# Patient Record
Sex: FEMALE | Race: WHITE | Employment: FULL TIME
[De-identification: names, ages, dates, MRNs, and addresses within clinical notes are randomized per-mention and may not be internally consistent; named-entity substitution may affect disease eponyms.]

---

## 2022-03-19 PROBLEM — G43.009 MIGRAINE WITHOUT AURA AND WITHOUT STATUS MIGRAINOSUS, NOT INTRACTABLE: Status: ACTIVE | Noted: 2021-02-17

## 2022-03-19 PROBLEM — F41.9 ANXIETY: Status: ACTIVE | Noted: 2021-01-20

## 2022-03-19 PROBLEM — F33.1 MODERATE EPISODE OF RECURRENT MAJOR DEPRESSIVE DISORDER (HCC): Status: ACTIVE | Noted: 2021-01-20

## 2022-09-16 ENCOUNTER — OFFICE VISIT (OUTPATIENT)
Dept: FAMILY MEDICINE CLINIC | Facility: CLINIC | Age: 27
End: 2022-09-16
Payer: COMMERCIAL

## 2022-09-16 VITALS
RESPIRATION RATE: 16 BRPM | DIASTOLIC BLOOD PRESSURE: 76 MMHG | OXYGEN SATURATION: 98 % | HEART RATE: 103 BPM | HEIGHT: 66 IN | WEIGHT: 144.2 LBS | BODY MASS INDEX: 23.18 KG/M2 | TEMPERATURE: 97.3 F | SYSTOLIC BLOOD PRESSURE: 114 MMHG

## 2022-09-16 DIAGNOSIS — F41.9 ANXIETY: ICD-10-CM

## 2022-09-16 DIAGNOSIS — F33.1 MODERATE EPISODE OF RECURRENT MAJOR DEPRESSIVE DISORDER (HCC): Primary | ICD-10-CM

## 2022-09-16 PROCEDURE — 99214 OFFICE O/P EST MOD 30 MIN: CPT | Performed by: FAMILY MEDICINE

## 2022-09-16 RX ORDER — ESCITALOPRAM OXALATE 20 MG/1
20 TABLET ORAL DAILY
Qty: 30 TABLET | Refills: 11 | Status: SHIPPED | OUTPATIENT
Start: 2022-09-16

## 2022-09-16 ASSESSMENT — PATIENT HEALTH QUESTIONNAIRE - PHQ9
3. TROUBLE FALLING OR STAYING ASLEEP: 0
8. MOVING OR SPEAKING SO SLOWLY THAT OTHER PEOPLE COULD HAVE NOTICED. OR THE OPPOSITE, BEING SO FIGETY OR RESTLESS THAT YOU HAVE BEEN MOVING AROUND A LOT MORE THAN USUAL: 0
9. THOUGHTS THAT YOU WOULD BE BETTER OFF DEAD, OR OF HURTING YOURSELF: 0
7. TROUBLE CONCENTRATING ON THINGS, SUCH AS READING THE NEWSPAPER OR WATCHING TELEVISION: 0
SUM OF ALL RESPONSES TO PHQ QUESTIONS 1-9: 6
SUM OF ALL RESPONSES TO PHQ QUESTIONS 1-9: 6
1. LITTLE INTEREST OR PLEASURE IN DOING THINGS: 1
SUM OF ALL RESPONSES TO PHQ QUESTIONS 1-9: 6
6. FEELING BAD ABOUT YOURSELF - OR THAT YOU ARE A FAILURE OR HAVE LET YOURSELF OR YOUR FAMILY DOWN: 1
5. POOR APPETITE OR OVEREATING: 2
SUM OF ALL RESPONSES TO PHQ QUESTIONS 1-9: 6
10. IF YOU CHECKED OFF ANY PROBLEMS, HOW DIFFICULT HAVE THESE PROBLEMS MADE IT FOR YOU TO DO YOUR WORK, TAKE CARE OF THINGS AT HOME, OR GET ALONG WITH OTHER PEOPLE: 1
SUM OF ALL RESPONSES TO PHQ9 QUESTIONS 1 & 2: 2
4. FEELING TIRED OR HAVING LITTLE ENERGY: 1
2. FEELING DOWN, DEPRESSED OR HOPELESS: 1

## 2022-09-16 NOTE — PROGRESS NOTES
[address] [facility] [place] [address]  Phone: [phone] Fax [phone]  Samantha Vallejo MD  9/16/2022           Ms. Maria Elena Lee  is a 24y.o.  year old  female patient who comes in to discuss issues related to her depression and anxiety. She is currently taking Lexapro 10 mg and it has been working well for her without side effects. Several months ago her  became very distant and now he says he wants to divorce and she is really been struggling over that. She is not having thoughts of suicide but she is not as happy as she works. PHQ-9  9/16/2022   Little interest or pleasure in doing things 1   Little interest or pleasure in doing things -   Feeling down, depressed, or hopeless 1   Trouble falling or staying asleep, or sleeping too much 0   Trouble falling or staying asleep, or sleeping too much -   Feeling tired or having little energy 1   Feeling tired or having little energy -   Poor appetite or overeating 2   Poor appetite, weight loss, or overeating -   Feeling bad about yourself - or that you are a failure or have let yourself or your family down 1   Feeling bad about yourself - or that you are a failure or have let yourself or your family down -   Trouble concentrating on things, such as reading the newspaper or watching television 0   Trouble concentrating on things such as school, work, reading, or watching TV -   Moving or speaking so slowly that other people could have noticed.  Or the opposite - being so fidgety or restless that you have been moving around a lot more than usual 0   Moving or speaking so slowly that other people could have noticed; or the opposite being so fidgety that others notice -   Thoughts that you would be better off dead, or of hurting yourself in some way 0   Thoughts of being better off dead, or hurting yourself in some way -   PHQ-2 Score 2   Total Score PHQ 2 -   PHQ-9 Total Score 6   PHQ 9 Score -   If you checked ([facility].)  -     escitalopram (LEXAPRO) 20 MG tablet; Take 1 tablet by mouth daily    Anxiety  -     escitalopram (LEXAPRO) 20 MG tablet; Take 1 tablet by mouth daily      Increase Lexapro. Follow-up 6 months sooner if needed.   Bradford Morrow MD

## 2023-03-17 ENCOUNTER — OFFICE VISIT (OUTPATIENT)
Dept: FAMILY MEDICINE CLINIC | Facility: CLINIC | Age: 28
End: 2023-03-17
Payer: COMMERCIAL

## 2023-03-17 VITALS
WEIGHT: 145 LBS | SYSTOLIC BLOOD PRESSURE: 108 MMHG | HEIGHT: 66 IN | TEMPERATURE: 97 F | RESPIRATION RATE: 16 BRPM | HEART RATE: 95 BPM | BODY MASS INDEX: 23.3 KG/M2 | DIASTOLIC BLOOD PRESSURE: 66 MMHG | OXYGEN SATURATION: 97 %

## 2023-03-17 DIAGNOSIS — F41.9 ANXIETY: ICD-10-CM

## 2023-03-17 DIAGNOSIS — F33.1 MODERATE EPISODE OF RECURRENT MAJOR DEPRESSIVE DISORDER (HCC): Primary | ICD-10-CM

## 2023-03-17 DIAGNOSIS — F51.01 PRIMARY INSOMNIA: ICD-10-CM

## 2023-03-17 DIAGNOSIS — J01.00 ACUTE NON-RECURRENT MAXILLARY SINUSITIS: ICD-10-CM

## 2023-03-17 PROCEDURE — 99214 OFFICE O/P EST MOD 30 MIN: CPT | Performed by: FAMILY MEDICINE

## 2023-03-17 RX ORDER — ESCITALOPRAM OXALATE 20 MG/1
20 TABLET ORAL DAILY
Qty: 30 TABLET | Refills: 11 | Status: SHIPPED | OUTPATIENT
Start: 2023-03-17

## 2023-03-17 RX ORDER — TRAZODONE HYDROCHLORIDE 50 MG/1
50 TABLET ORAL NIGHTLY PRN
Qty: 30 TABLET | Refills: 5 | Status: SHIPPED | OUTPATIENT
Start: 2023-03-17

## 2023-03-17 SDOH — ECONOMIC STABILITY: FOOD INSECURITY: WITHIN THE PAST 12 MONTHS, YOU WORRIED THAT YOUR FOOD WOULD RUN OUT BEFORE YOU GOT MONEY TO BUY MORE.: NEVER TRUE

## 2023-03-17 SDOH — ECONOMIC STABILITY: FOOD INSECURITY: WITHIN THE PAST 12 MONTHS, THE FOOD YOU BOUGHT JUST DIDN'T LAST AND YOU DIDN'T HAVE MONEY TO GET MORE.: NEVER TRUE

## 2023-03-17 SDOH — ECONOMIC STABILITY: INCOME INSECURITY: HOW HARD IS IT FOR YOU TO PAY FOR THE VERY BASICS LIKE FOOD, HOUSING, MEDICAL CARE, AND HEATING?: NOT HARD AT ALL

## 2023-03-17 SDOH — ECONOMIC STABILITY: HOUSING INSECURITY
IN THE LAST 12 MONTHS, WAS THERE A TIME WHEN YOU DID NOT HAVE A STEADY PLACE TO SLEEP OR SLEPT IN A SHELTER (INCLUDING NOW)?: NO

## 2023-03-17 ASSESSMENT — PATIENT HEALTH QUESTIONNAIRE - PHQ9
4. FEELING TIRED OR HAVING LITTLE ENERGY: 1
3. TROUBLE FALLING OR STAYING ASLEEP: 3
6. FEELING BAD ABOUT YOURSELF - OR THAT YOU ARE A FAILURE OR HAVE LET YOURSELF OR YOUR FAMILY DOWN: 1
2. FEELING DOWN, DEPRESSED OR HOPELESS: 1
SUM OF ALL RESPONSES TO PHQ QUESTIONS 1-9: 7
7. TROUBLE CONCENTRATING ON THINGS, SUCH AS READING THE NEWSPAPER OR WATCHING TELEVISION: 0
8. MOVING OR SPEAKING SO SLOWLY THAT OTHER PEOPLE COULD HAVE NOTICED. OR THE OPPOSITE, BEING SO FIGETY OR RESTLESS THAT YOU HAVE BEEN MOVING AROUND A LOT MORE THAN USUAL: 0
5. POOR APPETITE OR OVEREATING: 0
9. THOUGHTS THAT YOU WOULD BE BETTER OFF DEAD, OR OF HURTING YOURSELF: 0
SUM OF ALL RESPONSES TO PHQ9 QUESTIONS 1 & 2: 2
1. LITTLE INTEREST OR PLEASURE IN DOING THINGS: 1
SUM OF ALL RESPONSES TO PHQ QUESTIONS 1-9: 7
10. IF YOU CHECKED OFF ANY PROBLEMS, HOW DIFFICULT HAVE THESE PROBLEMS MADE IT FOR YOU TO DO YOUR WORK, TAKE CARE OF THINGS AT HOME, OR GET ALONG WITH OTHER PEOPLE: 1

## 2023-03-17 NOTE — PROGRESS NOTES
[address] [facility] [place]  [place], [place]  Phone: [phone] Fax [phone]  Archie Benavides MD  3/17/2023           Ms. Adelaide Ca  is a 34y.o.  year old  female patient who comes in related to anxiety and depression. She is doing well with the Lexapro 20 mg. About a month ago however she noticed she was having trouble getting to sleep. She has not yet tried melatonin. She would be willing to do that. She has been doing counseling weekly and that has helped. She was also seen in urgent care yesterday for sinus infection and is on Augmentin. PHQ-9  3/17/2023   Little interest or pleasure in doing things 1   Little interest or pleasure in doing things -   Feeling down, depressed, or hopeless 1   Trouble falling or staying asleep, or sleeping too much 3   Trouble falling or staying asleep, or sleeping too much -   Feeling tired or having little energy 1   Feeling tired or having little energy -   Poor appetite or overeating 0   Poor appetite, weight loss, or overeating -   Feeling bad about yourself - or that you are a failure or have let yourself or your family down 1   Feeling bad about yourself - or that you are a failure or have let yourself or your family down -   Trouble concentrating on things, such as reading the newspaper or watching television 0   Trouble concentrating on things such as school, work, reading, or watching TV -   Moving or speaking so slowly that other people could have noticed.  Or the opposite - being so fidgety or restless that you have been moving around a lot more than usual 0   Moving or speaking so slowly that other people could have noticed; or the opposite being so fidgety that others notice -   Thoughts that you would be better off dead, or of hurting yourself in some way 0   Thoughts of being better off dead, or hurting yourself in some way -   PHQ-2 Score 2   Total Score PHQ 2 -   PHQ-9 Total Score 7   PHQ 9 Score -   If you checked off any problems, how difficult have these problems made it for you to do your work, take care of things at home, or get along with other people? 1   How difficult have these problems made it for you to do your work, take care of your home and get along with others -        . She is not having thoughts of suicide. Ms. Inocente Elizalde  has  has no past medical history on file. Ms. Inocente Elizalde  has  has no past surgical history on file. Ms. Inocente Elizalde   Current Outpatient Medications   Medication Sig Dispense Refill    escitalopram (LEXAPRO) 20 MG tablet Take 1 tablet by mouth daily 30 tablet 11    traZODone (DESYREL) 50 MG tablet Take 1 tablet by mouth nightly as needed for Sleep 30 tablet 5     No current facility-administered medications for this visit.        Ms. Marilou Maya History     Socioeconomic History    Marital status:      Spouse name: None    Number of children: None    Years of education: None    Highest education level: None   Tobacco Use    Smoking status: Never    Smokeless tobacco: Never   Substance and Sexual Activity    Alcohol use: No     Social Determinants of Health     Financial Resource Strain: Low Risk     Difficulty of Paying Living Expenses: Not hard at all   Food Insecurity: No Food Insecurity    Worried About Running Out of Food in the Last Year: Never true    Ran Out of Food in the Last Year: Never true   Transportation Needs: Unknown    Lack of Transportation (Non-Medical): No   Housing Stability: Unknown    Unstable Housing in the Last Year: No       Ms. Inocente Elizalde   Family History   Problem Relation Age of Onset    Depression Mother     Hypertension Paternal Grandfather     Diabetes Paternal Grandmother     Diabetes Maternal Grandfather     Prostate Cancer Maternal Grandfather     Breast Cancer Maternal Grandmother     ADHD Brother     Elevated Lipids Father     Hypertension Father             Ms. Inocente Elizalde  has the following allergies: No Known Allergies    /66   Pulse 95   Temp 97 °F (36.1 °C)   Resp 16   Ht 5' 6\" (1.676 m)   Wt 145 lb (65.8 kg)   SpO2 97%   BMI 23.40 kg/m²     HEENT: Normocephalic, atraumatic, pupils equal and reactive to light. Tympanic membranes intact without erythema or edema. Oropharynx clear. Neck: Supple, no masses or thyromegaly. Lungs: clear to auscultation bilaterally. CV: regular rate and rhythm, without murmurs, rubs, or gallops  Ext: No lower extremity edema. hPil Meeks was seen today for depression. Diagnoses and all orders for this visit:    Moderate episode of recurrent major depressive disorder (HCC)  -     escitalopram (LEXAPRO) 20 MG tablet; Take 1 tablet by mouth daily    Anxiety  -     escitalopram (LEXAPRO) 20 MG tablet; Take 1 tablet by mouth daily    Primary insomnia  -     traZODone (DESYREL) 50 MG tablet; Take 1 tablet by mouth nightly as needed for Sleep    Acute non-recurrent maxillary sinusitis  Try 2 mg of melatonin. If that does not work she is to try the trazodone. She can try half a tablet and then go up to a full tablet if needed. Call for problems. Follow-up 6 months.     Mabel Woodall MD

## 2023-06-07 ENCOUNTER — OFFICE VISIT (OUTPATIENT)
Dept: FAMILY MEDICINE CLINIC | Facility: CLINIC | Age: 28
End: 2023-06-07
Payer: COMMERCIAL

## 2023-06-07 VITALS
RESPIRATION RATE: 16 BRPM | WEIGHT: 132.6 LBS | BODY MASS INDEX: 21.31 KG/M2 | SYSTOLIC BLOOD PRESSURE: 105 MMHG | HEART RATE: 94 BPM | DIASTOLIC BLOOD PRESSURE: 64 MMHG | HEIGHT: 66 IN | TEMPERATURE: 98.3 F | OXYGEN SATURATION: 98 %

## 2023-06-07 DIAGNOSIS — J01.00 ACUTE NON-RECURRENT MAXILLARY SINUSITIS: Primary | ICD-10-CM

## 2023-06-07 PROCEDURE — 99213 OFFICE O/P EST LOW 20 MIN: CPT | Performed by: FAMILY MEDICINE

## 2023-06-07 RX ORDER — AMOXICILLIN 500 MG/1
500 CAPSULE ORAL 2 TIMES DAILY
Qty: 20 CAPSULE | Refills: 0 | Status: SHIPPED | OUTPATIENT
Start: 2023-06-07 | End: 2023-06-17

## 2023-06-07 RX ORDER — BENZONATATE 100 MG/1
CAPSULE ORAL
Qty: 20 CAPSULE | Refills: 0 | Status: SHIPPED | OUTPATIENT
Start: 2023-06-07

## 2023-06-07 ASSESSMENT — PATIENT HEALTH QUESTIONNAIRE - PHQ9
1. LITTLE INTEREST OR PLEASURE IN DOING THINGS: 0
10. IF YOU CHECKED OFF ANY PROBLEMS, HOW DIFFICULT HAVE THESE PROBLEMS MADE IT FOR YOU TO DO YOUR WORK, TAKE CARE OF THINGS AT HOME, OR GET ALONG WITH OTHER PEOPLE: 0
7. TROUBLE CONCENTRATING ON THINGS, SUCH AS READING THE NEWSPAPER OR WATCHING TELEVISION: 0
6. FEELING BAD ABOUT YOURSELF - OR THAT YOU ARE A FAILURE OR HAVE LET YOURSELF OR YOUR FAMILY DOWN: 0
9. THOUGHTS THAT YOU WOULD BE BETTER OFF DEAD, OR OF HURTING YOURSELF: 0
SUM OF ALL RESPONSES TO PHQ9 QUESTIONS 1 & 2: 0
8. MOVING OR SPEAKING SO SLOWLY THAT OTHER PEOPLE COULD HAVE NOTICED. OR THE OPPOSITE, BEING SO FIGETY OR RESTLESS THAT YOU HAVE BEEN MOVING AROUND A LOT MORE THAN USUAL: 0
SUM OF ALL RESPONSES TO PHQ QUESTIONS 1-9: 0
SUM OF ALL RESPONSES TO PHQ QUESTIONS 1-9: 0
5. POOR APPETITE OR OVEREATING: 0
3. TROUBLE FALLING OR STAYING ASLEEP: 0
4. FEELING TIRED OR HAVING LITTLE ENERGY: 0
2. FEELING DOWN, DEPRESSED OR HOPELESS: 0
SUM OF ALL RESPONSES TO PHQ QUESTIONS 1-9: 0
SUM OF ALL RESPONSES TO PHQ QUESTIONS 1-9: 0

## 2023-06-07 NOTE — PROGRESS NOTES
[address] [facility]  [place], [place]  Phone: [phone] Fax [phone]  Merry Moritz, MD  6/7/2023         Ms. Indira Cuevas is a 34 y.o. female patient who comes in for chest pain during cough, nasal blockage, green nasal discharge, post nasal drip, productive cough, sinus and nasal congestion, and bilateral sinus pain . It has been going on for 4 weeks. It is getting worse. Has tried over the counter medicines without success. History reviewed. No pertinent past medical history. History reviewed. No pertinent surgical history. Current Outpatient Medications   Medication Sig Dispense Refill    amoxicillin (AMOXIL) 500 MG capsule Take 1 capsule by mouth 2 times daily for 10 days 20 capsule 0    benzonatate (TESSALON PERLES) 100 MG capsule One po tid prn cough 20 capsule 0    escitalopram (LEXAPRO) 20 MG tablet Take 1 tablet by mouth daily 30 tablet 11    traZODone (DESYREL) 50 MG tablet Take 1 tablet by mouth nightly as needed for Sleep 30 tablet 5     No current facility-administered medications for this visit.        Family History   Problem Relation Age of Onset    Depression Mother     Hypertension Paternal Grandfather     Diabetes Paternal Grandmother     Diabetes Maternal Grandfather     Prostate Cancer Maternal Grandfather     Breast Cancer Maternal Grandmother     ADHD Brother     Elevated Lipids Father     Hypertension Father        Social History     Socioeconomic History    Marital status:      Spouse name: Not on file    Number of children: Not on file    Years of education: Not on file    Highest education level: Not on file   Occupational History    Not on file   Tobacco Use    Smoking status: Never    Smokeless tobacco: Never   Substance and Sexual Activity    Alcohol use: No    Drug use: Not on file    Sexual activity: Not on file   Other Topics Concern    Not on file   Social History Narrative    Not on file     Social Determinants of

## 2023-09-18 ENCOUNTER — OFFICE VISIT (OUTPATIENT)
Dept: FAMILY MEDICINE CLINIC | Facility: CLINIC | Age: 28
End: 2023-09-18
Payer: COMMERCIAL

## 2023-09-18 VITALS
WEIGHT: 130 LBS | BODY MASS INDEX: 20.89 KG/M2 | TEMPERATURE: 97.1 F | HEIGHT: 66 IN | HEART RATE: 91 BPM | OXYGEN SATURATION: 99 % | DIASTOLIC BLOOD PRESSURE: 68 MMHG | SYSTOLIC BLOOD PRESSURE: 115 MMHG | RESPIRATION RATE: 16 BRPM

## 2023-09-18 DIAGNOSIS — F41.9 ANXIETY: ICD-10-CM

## 2023-09-18 DIAGNOSIS — F33.1 MODERATE EPISODE OF RECURRENT MAJOR DEPRESSIVE DISORDER (HCC): Primary | ICD-10-CM

## 2023-09-18 DIAGNOSIS — F51.01 PRIMARY INSOMNIA: ICD-10-CM

## 2023-09-18 PROCEDURE — 99214 OFFICE O/P EST MOD 30 MIN: CPT | Performed by: FAMILY MEDICINE

## 2023-09-18 RX ORDER — DESOGESTREL AND ETHINYL ESTRADIOL 21-5 (28)
1 KIT ORAL DAILY
Qty: 3 PACKET | Refills: 4 | Status: SHIPPED | OUTPATIENT
Start: 2023-09-18

## 2023-09-18 RX ORDER — TRAZODONE HYDROCHLORIDE 50 MG/1
50 TABLET ORAL NIGHTLY PRN
Qty: 30 TABLET | Refills: 5 | Status: SHIPPED | OUTPATIENT
Start: 2023-09-18

## 2023-09-18 RX ORDER — ESCITALOPRAM OXALATE 20 MG/1
20 TABLET ORAL DAILY
Qty: 30 TABLET | Refills: 11 | Status: SHIPPED | OUTPATIENT
Start: 2023-09-18

## 2023-09-18 ASSESSMENT — PATIENT HEALTH QUESTIONNAIRE - PHQ9
10. IF YOU CHECKED OFF ANY PROBLEMS, HOW DIFFICULT HAVE THESE PROBLEMS MADE IT FOR YOU TO DO YOUR WORK, TAKE CARE OF THINGS AT HOME, OR GET ALONG WITH OTHER PEOPLE: 1
3. TROUBLE FALLING OR STAYING ASLEEP: 0
SUM OF ALL RESPONSES TO PHQ QUESTIONS 1-9: 4
5. POOR APPETITE OR OVEREATING: 1
1. LITTLE INTEREST OR PLEASURE IN DOING THINGS: 0
6. FEELING BAD ABOUT YOURSELF - OR THAT YOU ARE A FAILURE OR HAVE LET YOURSELF OR YOUR FAMILY DOWN: 1
7. TROUBLE CONCENTRATING ON THINGS, SUCH AS READING THE NEWSPAPER OR WATCHING TELEVISION: 0
SUM OF ALL RESPONSES TO PHQ9 QUESTIONS 1 & 2: 1
4. FEELING TIRED OR HAVING LITTLE ENERGY: 0
9. THOUGHTS THAT YOU WOULD BE BETTER OFF DEAD, OR OF HURTING YOURSELF: 0
SUM OF ALL RESPONSES TO PHQ QUESTIONS 1-9: 4
2. FEELING DOWN, DEPRESSED OR HOPELESS: 1
8. MOVING OR SPEAKING SO SLOWLY THAT OTHER PEOPLE COULD HAVE NOTICED. OR THE OPPOSITE, BEING SO FIGETY OR RESTLESS THAT YOU HAVE BEEN MOVING AROUND A LOT MORE THAN USUAL: 1

## 2023-09-18 NOTE — PROGRESS NOTES
[address], [address] [place] [address]  Phone: [phone] Fax [phone]  Dean Penaloza MD  9/18/2023       Ms. Clint Nice  is a 34y.o.  year old  female patient who comes in for follow-up on issues related to anxiety and depression. She has been doing well with the Lexapro 20 mg. Couple months ago she felt the best she has ever been. She says she is getting up to the 1 year anniversary of her separation and she has had a little bit more anxiety here lately. She is also seeing someone recently, and it has brought up a lot of things about her  leaving her  and worry about that happening again, although this current person shows no signs of that. She is doing counseling. She is somewhat anxious about thinking about changing her medicine however as she has done well with that. She is doing well with melatonin for sleep. 9/18/2023     2:41 PM   PHQ-9    Little interest or pleasure in doing things 0   Feeling down, depressed, or hopeless 1   Trouble falling or staying asleep, or sleeping too much 0   Feeling tired or having little energy 0   Poor appetite or overeating 1   Feeling bad about yourself - or that you are a failure or have let yourself or your family down 1   Trouble concentrating on things, such as reading the newspaper or watching television 0   Moving or speaking so slowly that other people could have noticed. Or the opposite - being so fidgety or restless that you have been moving around a lot more than usual 1   Thoughts that you would be better off dead, or of hurting yourself in some way 0   PHQ-2 Score 1   PHQ-9 Total Score 4   If you checked off any problems, how difficult have these problems made it for you to do your work, take care of things at home, or get along with other people? 1               No data to display                Ms. Clint Nice  has  has no past medical history on file.     Ms. Clint Nice  has  has no past

## 2024-03-18 ENCOUNTER — TELEPHONE (OUTPATIENT)
Dept: FAMILY MEDICINE CLINIC | Facility: CLINIC | Age: 29
End: 2024-03-18

## 2024-03-18 NOTE — TELEPHONE ENCOUNTER
Pt of Jahaira Wright requesting Doctors note for   daith ear piercing for headaches. Stated she is at [employer] for   another 8 wks and was advised can keep piercing if Doctor note is   provided.

## 2024-03-18 NOTE — TELEPHONE ENCOUNTER
----- Message from Emily Cooper sent at 3/18/2024 12:46 PM EDT -----  Subject: Message to Provider    QUESTIONS  Information for Provider? Pt of Jahaira Darinflorence requesting Doctors note for   daith ear piercing for headaches. Stated she is at [employer] for   another 8 wks and was advised can keep piercing if Doctor note is   provided. Prefers note be sent via [employer] is possible.   ---------------------------------------------------------------------------  --------------  CALL BACK INFO  [phone]; OK to leave message on voicemail  ---------------------------------------------------------------------------  --------------  SCRIPT ANSWERS  Relationship to Patient? Self

## 2024-10-13 DIAGNOSIS — F41.9 ANXIETY: ICD-10-CM

## 2024-10-13 DIAGNOSIS — F33.1 MODERATE EPISODE OF RECURRENT MAJOR DEPRESSIVE DISORDER (HCC): ICD-10-CM

## 2024-10-15 RX ORDER — ESCITALOPRAM OXALATE 20 MG/1
20 TABLET ORAL DAILY
Qty: 30 TABLET | Refills: 11 | OUTPATIENT
Start: 2024-10-15